# Patient Record
(demographics unavailable — no encounter records)

---

## 2025-05-28 NOTE — HISTORY OF PRESENT ILLNESS
[de-identified] : Pt is a 48 yo male RHD who presents with lower neck/trap pain.  He works as a [employer] and states he was pulling up a ladder and felt a sharp pain in the lower neck and trapezius. Reports pain in central lower neck. Specific point tenderness Denies any numbness tingling or radiation down the arm Reports some improvement since Friday Has tried muscle relaxant which helped. Has tried ibuprofen which has helped

## 2025-05-28 NOTE — PHYSICAL EXAM
[de-identified] : Constitutional: Well-nourished, well-developed, No acute distress Respiratory:  Good respiratory effort, no SOB Lymphatic: No regional lymphadenopathy, no lymphedema Psychiatric: Pleasant and normal affect, alert and oriented x3 Musculoskeletal: normal except where as noted in regional exam  Constitutional: Well-nourished, well-developed, No acute distress Respiratory:  Good respiratory effort, no SOB Lymphatic: No regional lymphadenopathy, no lymphedema Psychiatric: Pleasant and normal affect, alert and oriented x3 Musculoskeletal: normal except where as noted in regional exam  Cervical Spine Exam APPEARANCE: no marked deformities or malalignment, normal curvature, good posture POSITIVE TENDERNESS: Mild tenderness at T1-T2 and T2-T3 supraspinous ligament NONTENDER: no bony midline tenderness, no marked tenderness in paracervicals or upper trapezius, no marked spasm. ROM: full & painless in all planes RESISTIVE TESTING: painless 5/5 resisted flex/ext, sidebending b/l, and shoulder shrug  SPECIAL TESTS: neg Spurling's b/l Vasc: 2+ radial pulse b/l Neuro: C5 - T1 intact to motor, DTRs 2+/4 biceps, triceps, brachioradialis Sensation: Intact to light touch throughout b/l UE  Thoracic spine:  normal curvature and normal alignment. good posture. no midline bony tenderness, no marked spasm. no marked tenderness in paraspinal muscles.  ROM full & painless all planes  [de-identified] : The following radiographs were ordered and read by me during this patient's visit. I reviewed each radiograph in detail with the patient and discussed the findings as highlighted below.   2 views of the cervical spine were obtained today that show degenerative changes and evidence of mild C5-C6 osteoarthritis.  No fracture, or dislocation seen at this time. There is no malalignment. No other obvious osseous abnormality. Otherwise unremarkable.  2 views of the thoracic spine were obtained today that show no fracture, or dislocation. There are no degenerative changes seen. There is no malalignment. No obvious osseous abnormality. Otherwise unremarkable.

## 2025-05-28 NOTE — DISCUSSION/SUMMARY
[de-identified] : Discussed findings of today's exam and possible causes of patient's pain.  Educated patient on their most probable diagnosis of cervicothoracic neck/upper back pain due to mild sprain at the upper thoracic region with resultant mild paraspinal muscle spasm.  Reviewed possible courses of treatment, and we collaboratively decided best course of treatment at this time will include conservative management.  Patient is given reassurance, he has no evidence of bony pathology on x-rays, no evidence of acute disc pathology or nerve impingement with absence of radicular features.  Patient is only been having 5 days of pain, he is advised that this type of injury can take 1-2 weeks to fully resolve.  He may utilize oral NSAIDs as needed for pain relief.  We discussed appropriate activity modification.  He may continue full work duty without restrictions.  If patient has persisting pain through next week would recommend a course of physical therapy at that time.  Follow up as needed.  Patient appreciates and agrees with current plan.  This note was generated using dragon medical dictation software.  A reasonable effort has been made for proofreading its contents, but typos may still remain.  If there are any questions or points of clarification needed please notify my office.